# Patient Record
Sex: FEMALE | Race: WHITE | NOT HISPANIC OR LATINO | Employment: FULL TIME | URBAN - METROPOLITAN AREA
[De-identification: names, ages, dates, MRNs, and addresses within clinical notes are randomized per-mention and may not be internally consistent; named-entity substitution may affect disease eponyms.]

---

## 2017-12-08 ENCOUNTER — HOSPITAL ENCOUNTER (OUTPATIENT)
Dept: RADIOLOGY | Facility: HOSPITAL | Age: 42
Discharge: HOME | End: 2017-12-08
Attending: INTERNAL MEDICINE
Payer: MEDICAID

## 2017-12-08 DIAGNOSIS — M79.643 PAIN OF HAND AND FINGERS: ICD-10-CM

## 2017-12-08 DIAGNOSIS — M79.646 PAIN OF HAND AND FINGERS: Primary | ICD-10-CM

## 2017-12-08 DIAGNOSIS — M79.646 PAIN OF HAND AND FINGERS: ICD-10-CM

## 2017-12-08 DIAGNOSIS — M79.643 PAIN OF HAND AND FINGERS: Primary | ICD-10-CM

## 2017-12-08 PROCEDURE — 73120 X-RAY EXAM OF HAND: CPT | Mod: 50,TC

## 2017-12-08 PROCEDURE — 73120 X-RAY EXAM OF HAND: CPT | Mod: 26,50,, | Performed by: RADIOLOGY

## 2017-12-15 DIAGNOSIS — M79.643 HAND PAIN: ICD-10-CM

## 2017-12-15 DIAGNOSIS — M79.646 FINGER PAIN: Primary | ICD-10-CM

## 2019-05-06 ENCOUNTER — TELEPHONE (OUTPATIENT)
Dept: TRANSPLANT | Facility: CLINIC | Age: 44
End: 2019-05-06

## 2019-05-06 NOTE — TELEPHONE ENCOUNTER
----- Message from Jennifer Meléndez sent at 5/6/2019 12:30 PM CDT -----    We have the pt records and they are now pending review by the referral nurse.  By:Jennifer Meléndez

## 2019-05-09 ENCOUNTER — DOCUMENTATION ONLY (OUTPATIENT)
Dept: TRANSPLANT | Facility: CLINIC | Age: 44
End: 2019-05-09

## 2019-05-09 NOTE — NURSING
Called referring, only lab orders from 2017 received. Requested lab results and updated labs. Per Mini, only lab results from 2017

## 2019-05-23 ENCOUNTER — DOCUMENTATION ONLY (OUTPATIENT)
Dept: TRANSPLANT | Facility: CLINIC | Age: 44
End: 2019-05-23

## 2019-05-23 NOTE — LETTER
May 23, 2019    Dolores Alicea      Dear Dr. Alicea    Patient: Dolores Alicea   MR Number: [id number]   YOB: 1975     Thank you for the referral of Dolores Alicea to the [facility] program. An initial appointment will be scheduled for your patient with one of our Hepatologists.      Thank you again for your trust in our program.  If there is anything we can do for you or your staff, please feel free to contact us.        Sincerely,        [facility]  [address] [place] [id number]  [phone]

## 2019-05-23 NOTE — NURSING
Pt records reviewed.  Pt will be referred to Hepatology due to + ABDULAZIZ  Initial referral received  from Dr. Yane Nagy  Referral letter sent to provider and patient.

## 2020-12-03 ENCOUNTER — OFFICE VISIT (OUTPATIENT)
Dept: URGENT CARE | Facility: CLINIC | Age: 45
End: 2020-12-03
Payer: MEDICAID

## 2020-12-03 VITALS
BODY MASS INDEX: 25.13 KG/M2 | SYSTOLIC BLOOD PRESSURE: 121 MMHG | DIASTOLIC BLOOD PRESSURE: 74 MMHG | TEMPERATURE: 97 F | HEART RATE: 85 BPM | HEIGHT: 60 IN | WEIGHT: 128 LBS | OXYGEN SATURATION: 99 %

## 2020-12-03 DIAGNOSIS — U07.1 COVID-19 VIRUS DETECTED: ICD-10-CM

## 2020-12-03 DIAGNOSIS — U07.1 COVID-19: ICD-10-CM

## 2020-12-03 DIAGNOSIS — R09.81 SINUS CONGESTION: Primary | ICD-10-CM

## 2020-12-03 LAB
CTP QC/QA: YES
SARS-COV-2 RDRP RESP QL NAA+PROBE: POSITIVE

## 2020-12-03 PROCEDURE — 99212 PR OFFICE/OUTPT VISIT, EST, LEVL II, 10-19 MIN: ICD-10-PCS | Mod: S$GLB,,, | Performed by: NURSE PRACTITIONER

## 2020-12-03 PROCEDURE — 87635 SARS-COV-2 COVID-19 AMP PRB: CPT | Mod: QW,S$GLB,, | Performed by: NURSE PRACTITIONER

## 2020-12-03 PROCEDURE — 99212 OFFICE O/P EST SF 10 MIN: CPT | Mod: S$GLB,,, | Performed by: NURSE PRACTITIONER

## 2020-12-03 PROCEDURE — 87635: ICD-10-PCS | Mod: QW,S$GLB,, | Performed by: NURSE PRACTITIONER

## 2020-12-03 NOTE — PATIENT INSTRUCTIONS
POSITIVE COVID TEST  You have tested positive for COVID-19 today.  Please note that patients who test positive for COVID-19 are required by the CDC to undergo isolation for 10 days after their symptoms first began.  This isolation starts from the day you first developed symptoms, not the day of your positive test. For example, if your symptoms began on a Monday but tested positive on the following Wednesday, your 10-day isolation begins from that Monday, not the Wednesday you tested positive.  However, if you are asymptomatic (a person who does not have any symptoms) and COVID-19 positive, your 10-day isolation begins on the day you tested positive, regardless of exposure date.  Also, per the CDC guidelines, once your 10 days have passed, and you have not had fever greater than 100.4F in the last 24 hours without taking any fever reducers such as Tylenol (Acetaminophen) or Motrin (Ibuprofen), you may return to your normal activities including social distancing, wearing masks, and frequent handwashing - YOU DO NOT NEED ANOTHER TEST IN ORDER TO END YOUR QUARANTINE.

## 2020-12-03 NOTE — PROGRESS NOTES
Subjective:       Patient ID: Dolores Alicea is a 45 y.o. female.    Vitals:  height is 5' (1.524 m) and weight is 58.1 kg (128 lb). Her temperature is 97 °F (36.1 °C). Her blood pressure is 121/74 and her pulse is 85. Her oxygen saturation is 99%.     Chief Complaint: Letter for School/Work    Pt was sick before thanksgiving and was supposed to go to pcp to make sure she was ok to go back to work but her pcp sent her here because they could not see her.  Provider note begins below  Pt reports sinus congestion, headache and dizziness last week.  She states she is feeling better.  Denies fevers.       Constitution: Negative for chills, fatigue and fever.   HENT: Positive for congestion. Negative for sore throat.    Neck: Negative for painful lymph nodes.   Cardiovascular: Negative for chest pain and leg swelling.   Eyes: Negative for double vision and blurred vision.   Respiratory: Negative for cough and shortness of breath.    Gastrointestinal: Negative for nausea, vomiting and diarrhea.   Genitourinary: Negative for dysuria, frequency, urgency and history of kidney stones.   Musculoskeletal: Negative for joint pain, joint swelling, muscle cramps and muscle ache.   Skin: Negative for color change, pale, rash and bruising.   Allergic/Immunologic: Negative for seasonal allergies.   Neurological: Negative for dizziness, history of vertigo, light-headedness, passing out and headaches.   Hematologic/Lymphatic: Negative for swollen lymph nodes.   Psychiatric/Behavioral: Negative for nervous/anxious, sleep disturbance and depression. The patient is not nervous/anxious.        Objective:      Physical Exam   Constitutional: She is oriented to person, place, and time.   HENT:   Head: Normocephalic and atraumatic.   Cardiovascular: Normal rate.   Pulmonary/Chest: Effort normal. No respiratory distress.   Abdominal: Normal appearance.   Neurological: She is alert and oriented to person, place, and time.   Skin: Skin is  warm and dry. Psychiatric: Her behavior is normal. Mood normal.     Results for orders placed or performed in visit on 12/03/20   POCT COVID-19 Rapid Screening   Result Value Ref Range    POC Rapid COVID Positive (A) Negative     Acceptable Yes          Assessment:       1. Sinus congestion    2. COVID-19        Plan:       Pt tested positive for covid.    May return to work on Monday.     Patient Instructions   POSITIVE COVID TEST  You have tested positive for COVID-19 today.  Please note that patients who test positive for COVID-19 are required by the CDC to undergo isolation for 10 days after their symptoms first began.  This isolation starts from the day you first developed symptoms, not the day of your positive test. For example, if your symptoms began on a Monday but tested positive on the following Wednesday, your 10-day isolation begins from that Monday, not the Wednesday you tested positive.  However, if you are asymptomatic (a person who does not have any symptoms) and COVID-19 positive, your 10-day isolation begins on the day you tested positive, regardless of exposure date.  Also, per the CDC guidelines, once your 10 days have passed, and you have not had fever greater than 100.4F in the last 24 hours without taking any fever reducers such as Tylenol (Acetaminophen) or Motrin (Ibuprofen), you may return to your normal activities including social distancing, wearing masks, and frequent handwashing - YOU DO NOT NEED ANOTHER TEST IN ORDER TO END YOUR QUARANTINE.        Sinus congestion  -     POCT COVID-19 Rapid Screening    COVID-19

## 2020-12-03 NOTE — LETTER
[address] [facility] [address]  Phone: [phone]  Fax: [phone]          Return to Work/School    Patient: Dolores Alicea  YOB: 1975   Date: 12/03/2020     To Whom It May Concern:     Dolores Alicea was in contact with/seen in my office on 12/03/2020. COVID-19 is present in our communities across the state. There is limited testing for COVID at this time, so not all patients can be tested. In this situation, your employee meets the following criteria:     Dolores Alicea has met the criteria for COVID-19 testing and has a POSITIVE result. She can return to work once they are asymptomatic for 24 hours without the use of fever reducing medications AND at least ten days from the start of symptoms (or from the first positive result if they have no symptoms).   She may return to work on 12/7/2020     If you have any questions or concerns, or if I can be of further assistance, please do not hesitate to contact me.     Sincerely,    Lolita Schmidt, NP